# Patient Record
Sex: FEMALE | Race: WHITE | Employment: OTHER | URBAN - METROPOLITAN AREA
[De-identification: names, ages, dates, MRNs, and addresses within clinical notes are randomized per-mention and may not be internally consistent; named-entity substitution may affect disease eponyms.]

---

## 2022-09-12 ENCOUNTER — NON-PROVIDER VISIT (OUTPATIENT)
Dept: OCCUPATIONAL MEDICINE | Facility: CLINIC | Age: 27
End: 2022-09-12

## 2022-09-12 DIAGNOSIS — Z02.1 PRE-EMPLOYMENT DRUG SCREENING: ICD-10-CM

## 2022-09-12 LAB
AMP AMPHETAMINE: NORMAL
COC COCAINE: NORMAL
INT CON NEG: NORMAL
INT CON POS: NORMAL
MET METHAMPHETAMINES: NORMAL
OPI OPIATES: NORMAL
PCP PHENCYCLIDINE: NORMAL
POC DRUG COMMENT 753798-OCCUPATIONAL HEALTH: NORMAL
THC: NORMAL

## 2022-09-12 PROCEDURE — 80305 DRUG TEST PRSMV DIR OPT OBS: CPT | Performed by: PREVENTIVE MEDICINE

## 2023-07-08 ENCOUNTER — OFFICE VISIT (OUTPATIENT)
Dept: URGENT CARE | Facility: PHYSICIAN GROUP | Age: 28
End: 2023-07-08
Payer: COMMERCIAL

## 2023-07-08 ENCOUNTER — APPOINTMENT (OUTPATIENT)
Dept: RADIOLOGY | Facility: IMAGING CENTER | Age: 28
End: 2023-07-08
Attending: NURSE PRACTITIONER
Payer: COMMERCIAL

## 2023-07-08 VITALS
TEMPERATURE: 99.6 F | SYSTOLIC BLOOD PRESSURE: 102 MMHG | HEART RATE: 72 BPM | DIASTOLIC BLOOD PRESSURE: 74 MMHG | RESPIRATION RATE: 12 BRPM | BODY MASS INDEX: 20.4 KG/M2 | WEIGHT: 130 LBS | OXYGEN SATURATION: 98 % | HEIGHT: 67 IN

## 2023-07-08 DIAGNOSIS — S92.355A CLOSED NONDISPLACED FRACTURE OF FIFTH METATARSAL BONE OF LEFT FOOT, INITIAL ENCOUNTER: Primary | ICD-10-CM

## 2023-07-08 DIAGNOSIS — M79.672 LEFT FOOT PAIN: ICD-10-CM

## 2023-07-08 PROCEDURE — 3078F DIAST BP <80 MM HG: CPT | Performed by: NURSE PRACTITIONER

## 2023-07-08 PROCEDURE — 99204 OFFICE O/P NEW MOD 45 MIN: CPT | Performed by: NURSE PRACTITIONER

## 2023-07-08 PROCEDURE — 3074F SYST BP LT 130 MM HG: CPT | Performed by: NURSE PRACTITIONER

## 2023-07-08 PROCEDURE — 73630 X-RAY EXAM OF FOOT: CPT | Mod: TC,LT | Performed by: STUDENT IN AN ORGANIZED HEALTH CARE EDUCATION/TRAINING PROGRAM

## 2023-07-08 NOTE — PROGRESS NOTES
"Subjective:     Verna Bradley is a 28 y.o. female who presents for Foot Problem (Left foot might be broken, bruised , hard to pressure on it, rolled ankle )      Foot Problem    Pt presents for evaluation of a new problem. Verna is a pleasant 28-year-old female presents to urgent care today with complaints of left-sided foot pain for the past 2 days.  She states that she was wearing loosefitting crocs when her foot twisted and she heard a loud pop.  She has been using elevation, ibuprofen and ice.  Her swelling has improved however, she continues to have bruising.  No previous fracture of left foot.  Her pain is rated as a 7/10.  Pain worsens with weightbearing.    ROS    PMH: No past medical history on file.  ALLERGIES: Not on File  SURGHX: No past surgical history on file.  SOCHX:   Social History     Socioeconomic History    Marital status:    Tobacco Use    Smoking status: Never    Smokeless tobacco: Never   Vaping Use    Vaping Use: Never used   Substance and Sexual Activity    Alcohol use: Not Currently    Drug use: Not Currently     FH: No family history on file.      Objective:   /74 (BP Location: Right arm, Patient Position: Sitting, BP Cuff Size: Adult)   Pulse 72   Temp 37.6 °C (99.6 °F) (Temporal)   Resp 12   Ht 1.702 m (5' 7\")   Wt 59 kg (130 lb)   SpO2 98%   BMI 20.36 kg/m²     Physical Exam  Vitals and nursing note reviewed.   Constitutional:       General: She is not in acute distress.     Appearance: Normal appearance. She is normal weight. She is not ill-appearing or toxic-appearing.   HENT:      Head: Normocephalic.      Right Ear: External ear normal.      Left Ear: External ear normal.      Nose: No congestion or rhinorrhea.      Mouth/Throat:      Pharynx: No oropharyngeal exudate or posterior oropharyngeal erythema.   Eyes:      General:         Right eye: No discharge.         Left eye: No discharge.      Pupils: Pupils are equal, round, and reactive to light.   Pulmonary: "      Effort: Pulmonary effort is normal.   Abdominal:      General: Abdomen is flat.   Musculoskeletal:      Cervical back: Normal range of motion and neck supple.      Left foot: Decreased range of motion. Tenderness and bony tenderness present. No swelling. Normal pulse.        Legs:    Skin:     General: Skin is dry.   Neurological:      General: No focal deficit present.      Mental Status: She is alert and oriented to person, place, and time. Mental status is at baseline.   Psychiatric:         Mood and Affect: Mood normal.         Behavior: Behavior normal.         Thought Content: Thought content normal.         Judgment: Judgment normal.     DX-FOOT-COMPLETE 3+ LEFT    Result Date: 7/8/2023 7/8/2023 1:13 PM HISTORY/REASON FOR EXAM:  Left foot pain, recent trauma TECHNIQUE/EXAM DESCRIPTION AND NUMBER OF VIEWS: 3 views of the LEFT foot. COMPARISON:  None. FINDINGS: BONE MINERALIZATION: Normal. JOINTS: Preserved. No erosions. FRACTURE: Acute, nondisplaced fifth metatarsal base fracture. DISLOCATION: None. SOFT TISSUES: Soft tissue swelling. No mass.     Acute, nondisplaced fifth metatarsal base fracture.     Assessment/Plan:   Assessment    1. Closed nondisplaced fracture of fifth metatarsal bone of left foot, initial encounter  Referral to Sports Medicine      2. Left foot pain  DX-FOOT-COMPLETE 3+ LEFT        X-ray results discussed with patient.  Due to fracture at fifth metatarsal she was placed in short walking boot.  Patient to follow-up with sports medicine for further evaluation of fracture.  Her pain is managed well with ibuprofen at this time.  RICE therapy discussed.  AVS handout given and reviewed with patient. Pt educated on red flags and when to seek treatment back in ER or UC.

## 2023-07-08 NOTE — LETTER
July 8, 2023    To Whom It May Concern:         This is confirmation that Verna Bradley attended her scheduled appointment with LITO Noble on 7/08/23.  Please allow her to sit at work throughout her shift as needed due to an acute fracture of her left foot.         If you have any questions please do not hesitate to call me at the phone number listed below.    Sincerely,          JADA Noble.  [phone]

## 2025-03-19 ENCOUNTER — OFFICE VISIT (OUTPATIENT)
Dept: URGENT CARE | Facility: PHYSICIAN GROUP | Age: 30
End: 2025-03-19
Payer: COMMERCIAL

## 2025-03-19 VITALS
SYSTOLIC BLOOD PRESSURE: 108 MMHG | DIASTOLIC BLOOD PRESSURE: 74 MMHG | HEART RATE: 97 BPM | RESPIRATION RATE: 12 BRPM | WEIGHT: 135 LBS | OXYGEN SATURATION: 98 % | BODY MASS INDEX: 21.14 KG/M2 | TEMPERATURE: 100 F

## 2025-03-19 DIAGNOSIS — R50.9 FEVER, UNSPECIFIED FEVER CAUSE: ICD-10-CM

## 2025-03-19 DIAGNOSIS — J06.9 UPPER RESPIRATORY TRACT INFECTION, UNSPECIFIED TYPE: ICD-10-CM

## 2025-03-19 LAB
FLUAV RNA SPEC QL NAA+PROBE: NEGATIVE
FLUBV RNA SPEC QL NAA+PROBE: NEGATIVE
RSV RNA SPEC QL NAA+PROBE: NEGATIVE
S PYO DNA SPEC NAA+PROBE: NOT DETECTED
SARS-COV-2 RNA RESP QL NAA+PROBE: NEGATIVE

## 2025-03-19 PROCEDURE — 0241U POCT CEPHEID COV-2, FLU A/B, RSV - PCR: CPT | Performed by: PHYSICIAN ASSISTANT

## 2025-03-19 PROCEDURE — 87651 STREP A DNA AMP PROBE: CPT | Performed by: PHYSICIAN ASSISTANT

## 2025-03-19 PROCEDURE — 3074F SYST BP LT 130 MM HG: CPT | Performed by: PHYSICIAN ASSISTANT

## 2025-03-19 PROCEDURE — 3078F DIAST BP <80 MM HG: CPT | Performed by: PHYSICIAN ASSISTANT

## 2025-03-19 PROCEDURE — 99213 OFFICE O/P EST LOW 20 MIN: CPT | Performed by: PHYSICIAN ASSISTANT

## 2025-03-19 ASSESSMENT — ENCOUNTER SYMPTOMS
SORE THROAT: 1
SPUTUM PRODUCTION: 1
NAUSEA: 0
CHILLS: 1
ABDOMINAL PAIN: 0
COUGH: 1
SINUS PAIN: 0
MYALGIAS: 0
WHEEZING: 1
VOMITING: 0
SHORTNESS OF BREATH: 0
HEMOPTYSIS: 0
FEVER: 1
HEADACHES: 0
DIARRHEA: 0
RHINORRHEA: 0

## 2025-03-19 NOTE — PATIENT INSTRUCTIONS
Medications  A prenatal vitamin containing 400 micrograms (0.4 milligrams) of folic acid is recommended on a daily basis while attempting and during pregnancy. If you have a history of a previous pregnancy with a neural tube defect, then you should take 4 milligrams of folic acid on a daily basis while attempting and during pregnancy.     Many women need to take prescription medication during their pregnancy for their own health. Many medications are safe during pregnancy and the benefit of some medications may outweigh the risk to your baby. All prescription medications and herbal supplements should be discussed with your physician at the start of your pregnancy or as they are prescribed to you.     Certain over the counter medications are safe in pregnancy:  For fever or pain: Tylenol regular strength (325 mg) two every four hours  For cough: Robitussin as directed on the label  For congestion: Chlortrimetron as directed on the label  For allergy: Benadryl or Claritin as directed on the label  For sore throat: Sucrets or other throat lozenges  For stuffy nose: saline nasal spray  For constipation: Colace as directed on the label  For hemorrhoids: Preparation-H ointment, Tucks pads or Anusol  For diarrhea: Imodium AD as directed on the label     Please avoid:  the use of Aspirin or aspirin containing products  Ibuprofen (Advil, Motrin)  Naproxen (Aleve)  laxatives and douches

## 2025-03-19 NOTE — PROGRESS NOTES
Subjective     Verna Bradley is a 30 y.o. female who presents with Cough (Cough, sore throat, weakness, 14 week pregnant, exposed to positive RSV, nausea, shortness of breath)            Cough  This is a new problem. Episode onset: 2-3 days. The problem has been unchanged. The problem occurs constantly. The cough is Productive of sputum. Associated symptoms include chills, a fever, nasal congestion, a sore throat and wheezing (mild). Pertinent negatives include no ear pain, headaches, hemoptysis, myalgias, postnasal drip, rhinorrhea or shortness of breath. Nothing aggravates the symptoms. She has tried nothing for the symptoms.     Patient is 14 weeks pregnant and her  recently tested positive for RSV      No past medical history on file.      No past surgical history on file.    No family history on file.    Allergies:  Formoso (diagnostic), Cashew nut oil, Hazelnuts, Latex, Lenny flavor, Peanut-derived, Pineapple, Sesame oil, Shrimp (diagnostic), Soy protein, [place], and Wheat    Medications, Allergies, and current problem list reviewed today in Epic    Review of Systems   Constitutional:  Positive for chills, fever and malaise/fatigue.   HENT:  Positive for congestion and sore throat. Negative for ear pain, postnasal drip, rhinorrhea and sinus pain.    Respiratory:  Positive for cough, sputum production and wheezing (mild). Negative for hemoptysis and shortness of breath.    Gastrointestinal:  Negative for abdominal pain, diarrhea, nausea and vomiting.   Musculoskeletal:  Negative for myalgias.   Neurological:  Negative for headaches.     All other systems reviewed and are negative.            Objective     /74 (BP Location: Left arm, Patient Position: Sitting, BP Cuff Size: Adult)   Pulse 97   Temp 37.8 °C (100 °F) (Temporal)   Resp 12   Wt 61.2 kg (135 lb)   SpO2 98%   BMI 21.14 kg/m²      Physical Exam  Constitutional:       General: She is not in acute distress.     Appearance: She is not  ill-appearing.   HENT:      Head: Normocephalic and atraumatic.      Nose: Congestion and rhinorrhea present.      Mouth/Throat:      Mouth: Mucous membranes are moist.      Pharynx: No posterior oropharyngeal erythema.   Eyes:      Conjunctiva/sclera: Conjunctivae normal.   Cardiovascular:      Rate and Rhythm: Normal rate and regular rhythm.      Heart sounds: Normal heart sounds. No murmur heard.  Pulmonary:      Effort: Pulmonary effort is normal. No respiratory distress.      Breath sounds: Normal breath sounds. No stridor. No wheezing, rhonchi or rales.   Skin:     General: Skin is warm and dry.   Neurological:      General: No focal deficit present.      Mental Status: She is alert and oriented to person, place, and time.   Psychiatric:         Mood and Affect: Mood normal.         Behavior: Behavior normal.         Thought Content: Thought content normal.         Judgment: Judgment normal.                                Assessment & Plan  Fever, unspecified fever cause    Orders:    POCT CoV-2, Flu A/B, RSV by PCR- negative    POCT CEPHEID GROUP A STREP - PCR - negative     Upper respiratory tract infection, unspecified type          Viral etiology discussed. Continue conservative treatment.  -  AVS printed with home care instructions and red flags and warning signs that warrant ER evaluation or immediate follow-up.  - AVS includes appropriate URI medications she can take while pregnant       Differential diagnoses, Supportive care, and indications for immediate follow-up discussed with patient.   Pathogenesis of diagnosis discussed including typical length and natural progression.   Instructed to return to clinic or nearest emergency department for any change in condition, further concerns, or worsening of symptoms.    The patient demonstrated a good understanding and agreed with the treatment plan.    Gina Gant P.A.-C.